# Patient Record
Sex: MALE | Race: WHITE | Employment: FULL TIME | URBAN - METROPOLITAN AREA
[De-identification: names, ages, dates, MRNs, and addresses within clinical notes are randomized per-mention and may not be internally consistent; named-entity substitution may affect disease eponyms.]

---

## 2019-02-20 ENCOUNTER — HOSPITAL ENCOUNTER (EMERGENCY)
Facility: CLINIC | Age: 54
Discharge: HOME | End: 2019-02-20
Attending: NURSE PRACTITIONER | Admitting: NURSE PRACTITIONER

## 2019-02-20 VITALS
OXYGEN SATURATION: 97 % | SYSTOLIC BLOOD PRESSURE: 158 MMHG | WEIGHT: 255 LBS | BODY MASS INDEX: 34.54 KG/M2 | HEIGHT: 72 IN | TEMPERATURE: 98.1 F | DIASTOLIC BLOOD PRESSURE: 92 MMHG | RESPIRATION RATE: 16 BRPM

## 2019-02-20 DIAGNOSIS — H66.002 ACUTE SUPPURATIVE OTITIS MEDIA OF LEFT EAR WITHOUT SPONTANEOUS RUPTURE OF TYMPANIC MEMBRANE, RECURRENCE NOT SPECIFIED: ICD-10-CM

## 2019-02-20 DIAGNOSIS — J01.90 ACUTE NON-RECURRENT SINUSITIS, UNSPECIFIED LOCATION: ICD-10-CM

## 2019-02-20 PROCEDURE — 99214 OFFICE O/P EST MOD 30 MIN: CPT | Mod: Z6 | Performed by: NURSE PRACTITIONER

## 2019-02-20 PROCEDURE — G0463 HOSPITAL OUTPT CLINIC VISIT: HCPCS | Performed by: NURSE PRACTITIONER

## 2019-02-20 RX ORDER — AMOXICILLIN 875 MG
875 TABLET ORAL 2 TIMES DAILY
Qty: 20 TABLET | Refills: 0 | Status: SHIPPED | OUTPATIENT
Start: 2019-02-20 | End: 2019-03-02

## 2019-02-20 RX ORDER — FLUTICASONE PROPIONATE 50 MCG
2 SPRAY, SUSPENSION (ML) NASAL DAILY
Qty: 1 BOTTLE | Refills: 0 | Status: SHIPPED | OUTPATIENT
Start: 2019-02-20 | End: 2019-03-22

## 2019-02-20 ASSESSMENT — ENCOUNTER SYMPTOMS
CHILLS: 0
VOMITING: 0
HEADACHES: 0
NAUSEA: 0
FEVER: 0
COUGH: 0
LIGHT-HEADEDNESS: 0
SINUS PRESSURE: 1
DIZZINESS: 0

## 2019-02-20 ASSESSMENT — MIFFLIN-ST. JEOR: SCORE: 2034.67

## 2019-02-20 NOTE — ED PROVIDER NOTES
History     Chief Complaint   Patient presents with     Otalgia     left ear pain     HPI  Radhames Moreno is a 53 year old male who presents to urgent care for evaluation of left ear pain.  Patient reports sinus congestion for the last 3 days.  Left ear pain started today.  Denies fever or chills.  Denies nausea or vomiting.     Allergies:  No Known Allergies    Problem List:    There are no active problems to display for this patient.       Past Medical History:    History reviewed. No pertinent past medical history.    Past Surgical History:    No past surgical history on file.    Family History:    No family history on file.    Social History:  Marital Status:  Single [1]  Social History     Tobacco Use     Smoking status: Not on file   Substance Use Topics     Alcohol use: Not on file     Drug use: Not on file        Medications:      amoxicillin (AMOXIL) 875 MG tablet   fluticasone (FLONASE) 50 MCG/ACT nasal spray         Review of Systems   Constitutional: Negative for chills and fever.   HENT: Positive for congestion, ear pain and sinus pressure.    Respiratory: Negative for cough.    Cardiovascular: Negative for chest pain.   Gastrointestinal: Negative for nausea and vomiting.   Skin: Negative for rash.   Neurological: Negative for dizziness, light-headedness and headaches.       Physical Exam   BP: (!) 158/92  Heart Rate: 72  Temp: 98.1  F (36.7  C)  Resp: 16  Height: 182.9 cm (6')  Weight: 115.7 kg (255 lb)  SpO2: 97 %      Physical Exam    GENERAL APPEARANCE: healthy, alert and no aucte distress  EYES: EOMI, conjunctiva clear  HENT: bilateral ear canals clear, intact, and without inflammation. Right TM normal. Left TM erythema, bulging and effusion present.  Nasal turbinated erythematous. Oropharynx without ulcers, erythema or lesions.   NECK: supple, nontender, no lymphadenopathy  RESP: lungs clear to auscultation - no rales, rhonchi or wheezes  CV: regular rates and rhythm, normal S1 S2, no murmur  noted      ED Course        Procedures               No results found for this or any previous visit (from the past 24 hour(s)).    Medications - No data to display    Assessments & Plan (with Medical Decision Making)   Left AOM-suppurative on exam. Likely secondary to sinusitis. Plan as follows:  Amoxicillin 875 mg twice a day for 10 days.  Flonase 2 sprays each nostril daily.  Ibuprofen (Advil) 600 mg every 6 hours as needed for pain.  (Take with food).  Acetaminophen (Tylenol) 650 mg every 4-6 hours as needed for pain.      I have reviewed the nursing notes.    I have reviewed the findings, diagnosis, plan and need for follow up with the patient.         Medication List      Started    amoxicillin 875 MG tablet  Commonly known as:  AMOXIL  875 mg, Oral, 2 TIMES DAILY     fluticasone 50 MCG/ACT nasal spray  Commonly known as:  FLONASE  2 sprays, Both Nostrils, DAILY            Final diagnoses:   Acute non-recurrent sinusitis, unspecified location   Acute suppurative otitis media of left ear without spontaneous rupture of tympanic membrane, recurrence not specified       2/20/2019   [facility] EMERGENCY DEPARTMENT     Cheryl Stone APRN CNP  02/20/19 [id number]

## 2019-02-20 NOTE — DISCHARGE INSTRUCTIONS
Amoxicillin 875 mg twice a day for 10 days.  Flonase 2 sprays each nostril daily.  Ibuprofen (Advil) 600 mg every 6 hours as needed for pain.  (Take with food).  Acetaminophen (Tylenol) 650 mg every 4-6 hours as needed for pain.

## 2019-02-20 NOTE — ED AVS SNAPSHOT
[facility]  [address] [address]  Phone:  [phone]  Fax:  [phone]                                    Radhames Moreno   MRN: [id number]    Department:  [facility]   Date of Visit:  2/20/2019           After Visit Summary Signature Page    I have received my discharge instructions, and my questions have been answered. I have discussed any challenges I see with this plan with the nurse or doctor.    ..........................................................................................................................................  Patient/Patient Representative Signature      ..........................................................................................................................................  Patient Representative Print Name and Relationship to Patient    ..................................................               ................................................  Date                                   Time    ..........................................................................................................................................  Reviewed by Signature/Title    ...................................................              ..............................................  Date                                               Time          22EPIC Rev 08/18